# Patient Record
Sex: FEMALE | Race: ASIAN | NOT HISPANIC OR LATINO | Employment: STUDENT
[De-identification: names, ages, dates, MRNs, and addresses within clinical notes are randomized per-mention and may not be internally consistent; named-entity substitution may affect disease eponyms.]

---
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
## 2023-06-08 ENCOUNTER — OFFICE VISIT (OUTPATIENT)
Dept: FAMILY MEDICINE | Facility: CLINIC | Age: 18
End: 2023-06-08
Payer: COMMERCIAL

## 2023-06-08 VITALS
HEIGHT: 63 IN | BODY MASS INDEX: 25.34 KG/M2 | HEART RATE: 84 BPM | SYSTOLIC BLOOD PRESSURE: 99 MMHG | DIASTOLIC BLOOD PRESSURE: 64 MMHG | OXYGEN SATURATION: 98 % | WEIGHT: 143 LBS

## 2023-06-08 DIAGNOSIS — K21.9 GASTROESOPHAGEAL REFLUX DISEASE WITHOUT ESOPHAGITIS: ICD-10-CM

## 2023-06-08 DIAGNOSIS — K58.2 IRRITABLE BOWEL SYNDROME WITH BOTH CONSTIPATION AND DIARRHEA: Primary | ICD-10-CM

## 2023-06-08 PROCEDURE — 99213 OFFICE O/P EST LOW 20 MIN: CPT | Mod: GC

## 2023-06-08 RX ORDER — FAMOTIDINE 20 MG/1
20 TABLET, FILM COATED ORAL 2 TIMES DAILY
Qty: 30 TABLET | Refills: 1 | Status: SHIPPED | OUTPATIENT
Start: 2023-06-08

## 2023-06-08 RX ORDER — BISACODYL 5 MG/1
5-10 TABLET, DELAYED RELEASE ORAL DAILY PRN
Qty: 30 TABLET | Refills: 0 | Status: SHIPPED | OUTPATIENT
Start: 2023-06-08

## 2023-06-08 NOTE — LETTER
June 8, 2023      RE: Berny ROSS Her  [address] [facility] [address]       To whom it may concern:    Berny Arias was seen in our clinic today 06/08/2023. She may return to work without any restrictions.     Sincerely,      Nury Douglas MD

## 2023-06-08 NOTE — PROGRESS NOTES
Preceptor Attestation:   Patient seen, evaluated and discussed with the resident. I have verified the content of the note, which accurately reflects my assessment of the patient and the plan of care.    Supervising Physician:Susanne Reid MD    [facility]

## 2023-06-08 NOTE — PROGRESS NOTES
"  Assessment & Plan     Berny is a 17 year old female who presents today for evaluation of abdominal pain and constipation. She states that the pain is crampy and localized to the epigastric region radiating inferiorly. She has also been under a lot of stress recently.     Berny's symptoms are most likely due to irritable bowel syndrome or peptic ulcer disease. Her stress has been high recently and her abdominal pain is crampy pointing towards irritable bowel syndrome. She has also been feeling \"fullness\" in her epigastric region recently potentially indicating an ulcer. Education about diet, stress management, and exercise was given to the patient to reduce irritable bowel symptoms. Additionally, a H. Pylori stool test will be done to assess for peptic ulcer disease.     Patient was advised to begin taking Pepcid in order to reduce any symptoms of GERD.     Patient prescribed bisacodyl to improve constipation.     Irritable bowel syndrome with both constipation and diarrhea  - bisacodyl (DULCOLAX) 5 MG EC tablet  Dispense: 30 tablet; Refill: 0    Gastroesophageal reflux disease without esophagitis  - famotidine (PEPCID) 20 MG tablet  Dispense: 30 tablet; Refill: 1  - Helicobacter pylori Antigen Stool    Patient advised to follow up in a couple weeks if symptoms don't improve for further workup.       I was present with the medical student who participated in the service and in the documentation of this note. I have verified the history and personally performed the physical exam and medical decision making, and have verified the content of the note, which accurately reflects my assessment of the patient and the plan of care.     Nury Douglas MD  [facility]idency Program, PGY-2  Pager #: [phone] or contact via [employer].    Precepted with Dr. Reid.    Subjective   Berny is a 17 year old, presenting for the following health issues:  Abdominal Pain (Stomach pain comes and " "goes. Pt has constipation as well. )    Stomach pain:  Berny reports stomach pain that comes and goes. She feels like cramps that are all over stomach. States pain starts at mid epigastric region and radiates inferiorly. Pain started around 5 am. She had only had a small cup of peaches before the pain started this morning. The pain is worse when trying to have a bowel movement. She also reports that pain gets worse with eating- feels this is related to her constipation. Berny states the last time she had a bowel movement was this morning. During the bowel movement she had a lot of cramping. The bowel movement took awhile. Her stool was hard at first and then became liquidly before becoming solid again.     Berny states that she has had this pain before and that it got better staying away from spicy food and dairy. She was also stressed during the time she last had the symptoms. Patient states the pain feels similar to her symptoms from last fall.     Patient has not felt hungry for the last couple of weeks. She thinks that this could be due to stress/current constipation. She states that she is still eating even though she does not feel hungry. She reports having feelings of fullness like she had eaten a big meal even though she had not. She also feels nauseous with eating and only eats small meals.     Constipation:  Patient reports that she has been constipated lately. She states that she eats meat and some veggies and white rice. She has tried to eat yogurt, oats, prune juice, and \"cokona water?\" to make the constipation better but it has not improved the constipation at all. Does not note that anything has made her constipation worse. Berny also reports blood on outside of stool. One episode of black stool a month ago.     Patient reports that there is pain with palpation of her epigastric region.     Berny has had heartburn in the past. She reports that this has not been causing her many " "issues. She is not taking anything for her heartburn currently. She was prescribed Pepcid for this but never picked it up.     Stress:   Berny reports that she has been under a lot of stress recently. She started a new job and recently moved in with her aunt away from her siblings. She states that she struggles to express herself.    Has experienced chest tightening, chest pain, dyspnea. Has been separate from her abdominal symptoms.       Objective    BP 99/64   Pulse 84   Ht 1.6 m (5' 3\")   Wt 64.9 kg (143 lb)   LMP  (LMP Unknown)   SpO2 98%   BMI 25.33 kg/m    Body mass index is 25.33 kg/m .  Physical Exam   Heart: normal rate and rhythm, no gallops, rubs, or murmurs.  Respiratory: clear to ascultation, potentially some bowel sounds in area of left lower lobe.  Abdominal: bowel sounds in left quadrants and middle epigastric quadrant, mild pain to palpation in epigastric region. Abdomen soft to palpation.   "

## 2023-06-12 ENCOUNTER — OFFICE VISIT (OUTPATIENT)
Dept: FAMILY MEDICINE | Facility: CLINIC | Age: 18
End: 2023-06-12
Payer: COMMERCIAL

## 2023-06-12 VITALS
HEIGHT: 63 IN | TEMPERATURE: 98.6 F | WEIGHT: 146 LBS | BODY MASS INDEX: 25.87 KG/M2 | OXYGEN SATURATION: 98 % | DIASTOLIC BLOOD PRESSURE: 71 MMHG | SYSTOLIC BLOOD PRESSURE: 115 MMHG | RESPIRATION RATE: 20 BRPM | HEART RATE: 72 BPM

## 2023-06-12 DIAGNOSIS — K59.09 OTHER CONSTIPATION: Primary | ICD-10-CM

## 2023-06-12 PROCEDURE — 99213 OFFICE O/P EST LOW 20 MIN: CPT | Mod: GC

## 2023-06-12 RX ORDER — POLYETHYLENE GLYCOL 3350 17 G/17G
1 POWDER, FOR SOLUTION ORAL DAILY
Qty: 238 G | Refills: 0 | Status: SHIPPED | OUTPATIENT
Start: 2023-06-12

## 2023-06-12 NOTE — PROGRESS NOTES
Assessment & Plan     Other constipation  Berny presents for follow-up for abdominal pain and constipation, last seen on 6/8/23. At that time, testing for H. Pylori was ordered, and she was prescribed Pepcid and bisacodyl. She has not been able to complete the H. Pylori testing yet because she has not had a sufficient bowel movement. She continues to struggle with intermittent epigastric abdominal pain, and her constipation has not improved yet despite bisacodyl. Discussed needing the H. Pylori test before making any further work-up/treatment recommendations for the epigastric pain, and the likelihood that her pain will improve after achieving regular bowel movements. Discussed using an enema versus a colonoscopy prep to reach this goal; Berny elected for the colonoscopy prep. Discussed how to perform this, and that the goal to have multiple bowel movements, and to collect the sample for the H. Pylori test. Plan to follow-up in two weeks with myself or Dr. Douglas.   - Polyethylene glycol (MIRALAX) 17 GM/Dose powder; Take 17 g (1 Capful) by mouth daily    Return in about 2 weeks (around 6/26/2023).    Donya Oshea MD  [facility] Subjective   Berny is a 17 year old, presenting for the following health issues:  RECHECK (Stomach pain/burning/constipation)    HPI     Abdominal pain, return  - Patient was seen on 6/8/23 for stomach pain. Differential included irritable bowel syndrome versus GERD versus PUD. Testing for H. Pylori was ordered, and she was prescribed Pepcid and bisacodyl.    - Abdominal pain: continues to be intermittent in nature, describes more of an epigastric pain.  No change from when she was seen last.   - Notes decreased appetite, has mild nausea after eating. Denies vomiting or fevers.   - Started taking the presribed medications, hasn't had any issues with the new medications.   - Last bowel movement was yesterday, small amount. Notes straining  "with bowel movements.   - Has been struggling with constipation for the last month  - Drinks 4-5 water bottles a day, also drinks tea throughout the day.   - No correlation with the abdominal pain and periods.     Review of Systems   Reports intermittent epigastric abdominal pain, constipation and nausea; denies fevers, vomiting, bloody stool       Objective    /71   Pulse 72   Temp 98.6  F (37  C)   Resp 20   Ht 1.6 m (5' 2.99\")   Wt 66.2 kg (146 lb)   LMP  (LMP Unknown)   SpO2 98%   BMI 25.87 kg/m    Body mass index is 25.87 kg/m .  Physical Exam   GENERAL: healthy, alert and no distress  EYES: Eyes grossly normal to inspection, PERRL and conjunctivae and sclerae normal  RESP: lungs clear to auscultation - no rales, rhonchi or wheezes  CV: regular rate and rhythm, normal S1 S2, no S3 or S4, no murmur, click or rub, no peripheral edema and peripheral pulses strong  ABDOMEN: soft, no hepatosplenomegaly or masses and bowel sounds normal. Mild diffuse tenderness, increased in epigastric area.   MS: no gross musculoskeletal defects noted, no edema  PSYCH: mentation appears normal, affect normal/bright            "

## 2023-06-12 NOTE — PATIENT INSTRUCTIONS
Let's get this poop out. We will have you do a colonoscopy prep. You will split the miralax between two 32 ounce Gatorade bottles. Drink about a glass every 15 minutes. Drink this until you poop.

## 2023-06-12 NOTE — LETTER
RETURN TO WORK/SCHOOL FORM    6/12/2023    Re: Berny ROSS Her  2005      To Whom It May Concern:    Berny Arias was seen in clinic today..  She may return to work without restrictions on 6/13/23         Donya Oshea MD  6/12/2023 10:21 AM

## 2023-12-17 ENCOUNTER — HEALTH MAINTENANCE LETTER (OUTPATIENT)
Age: 18
End: 2023-12-17

## 2025-01-12 ENCOUNTER — HEALTH MAINTENANCE LETTER (OUTPATIENT)
Age: 20
End: 2025-01-12